# Patient Record
Sex: FEMALE | Race: WHITE | Employment: UNEMPLOYED | URBAN - METROPOLITAN AREA
[De-identification: names, ages, dates, MRNs, and addresses within clinical notes are randomized per-mention and may not be internally consistent; named-entity substitution may affect disease eponyms.]

---

## 2017-04-14 ENCOUNTER — TELEPHONE (OUTPATIENT)
Dept: OPTOMETRY | Facility: CLINIC | Age: 37
End: 2017-04-14

## 2017-04-14 NOTE — TELEPHONE ENCOUNTER
[facility] Call Center    Phone Message    Name of Caller: Samra    Phone Number: Cell number on file:    Telephone Information:   Mobile [phone]       Best time to return call: any    May a detailed message be left on voicemail: yes    Relation to patient: Self    Reason for Call: Other: Patient is calling requesting to speak with the clinic about her contacts. Patient is scheduled for 05/26 but patient is worried she will run out of contacts for one of her eyes and would like to speak with Dr. Orozco staff. Please advise.     Action Taken: Message routed to:  Adult Clinics: Eye p [id number]

## 2017-05-26 ENCOUNTER — OFFICE VISIT (OUTPATIENT)
Dept: OPTOMETRY | Facility: CLINIC | Age: 37
End: 2017-05-26
Payer: COMMERCIAL

## 2017-05-26 DIAGNOSIS — H52.13 MYOPIA, BILATERAL: Primary | ICD-10-CM

## 2017-05-26 PROCEDURE — 92014 COMPRE OPH EXAM EST PT 1/>: CPT | Performed by: OPTOMETRIST

## 2017-05-26 PROCEDURE — 92015 DETERMINE REFRACTIVE STATE: CPT | Performed by: OPTOMETRIST

## 2017-05-26 ASSESSMENT — REFRACTION_WEARINGRX
OD_SPHERE: -6.25
OS_SPHERE: -6.00
SPECS_TYPE: AUTO/SVL
SPECS_TYPE: EXAM
OD_AXIS: 100
OD_CYLINDER: SPHERE
OS_SPHERE: -6.75
OD_SPHERE: -5.50
OD_CYLINDER: +0.50
OS_CYLINDER: SPHERE

## 2017-05-26 ASSESSMENT — REFRACTION_CURRENTRX
OD_BRAND: ACUVUE OASYS
OD_DIAMETER: 14.0
OS_BRAND: ACUVUE OASYS
OD_BASECURVE: 8.4
OS_BASECURVE: 8.4
OS_DIAMETER: 14.0
OS_SPHERE: -6.00
OD_SPHERE: -5.75

## 2017-05-26 ASSESSMENT — VISUAL ACUITY
CORRECTION_TYPE: CONTACTS
OS_CC: 20/20
OD_SC: 20/20
OS_SC: 20/20
OD_CC: 20/20
METHOD: SNELLEN - LINEAR
OD_CC+: -1

## 2017-05-26 ASSESSMENT — TONOMETRY
OS_IOP_MMHG: 9
IOP_METHOD: TONOPEN
OD_IOP_MMHG: 11

## 2017-05-26 ASSESSMENT — CONF VISUAL FIELD
OS_NORMAL: 1
METHOD: COUNTING FINGERS
OD_NORMAL: 1

## 2017-05-26 ASSESSMENT — REFRACTION_MANIFEST
OD_SPHERE: -6.00
OS_SPHERE: -6.75

## 2017-05-26 ASSESSMENT — EXTERNAL EXAM - RIGHT EYE: OD_EXAM: NORMAL

## 2017-05-26 ASSESSMENT — SLIT LAMP EXAM - LIDS
COMMENTS: NORMAL
COMMENTS: NORMAL

## 2017-05-26 ASSESSMENT — CUP TO DISC RATIO
OD_RATIO: 0.2
OS_RATIO: 0.2

## 2017-05-26 ASSESSMENT — EXTERNAL EXAM - LEFT EYE: OS_EXAM: NORMAL

## 2017-05-26 NOTE — PROGRESS NOTES
Chief Complaint   Patient presents with     COMPREHENSIVE EYE EXAM     more contacts/fit fee ok if needed        Previous contact lens wearer? Yes: oasys  Comfort of contact lenses :fine  Satisfied with current lenses: Yes,but power changing        Last Eye Exam: 1-6-2016  Dilated Previously: Yes    What are you currently using to see?  glasses and contacts    Distance Vision Acuity: Noticed gradual change in both eyes    Near Vision Acuity: Satisfied with vision while reading  with glasses or contacts    Eye Comfort: good  Do you use eye drops? : No  Occupation or Hobbies: stay at home      Maribeth Aguirre Optometric Assistant, A.B.O.C.     Medical, surgical and family histories reviewed and updated 5/26/2017.       OBJECTIVE: See Ophthalmology exam    ASSESSMENT:    ICD-10-CM    1. Myopia, bilateral H52.13 EYE EXAM (SIMPLE-NONBILLABLE)     REFRACTION      PLAN:     Patient Instructions   Eyeglass prescription given.    Contact lens prescription given.  There is no change in the prescription.    Return in 1 year for a complete eye exam or sooner if needed.    Kelvin Bal, OD

## 2017-05-26 NOTE — PATIENT INSTRUCTIONS
Eyeglass prescription given.    Contact lens prescription given.  There is no change in the prescription.    Return in 1 year for a complete eye exam or sooner if needed.    Kelvin Bal OD    You can order your contact lenses online at [email or web address].  Click on services at the top of the page, then eye care and you will see the link to order contact lenses.  You can also order contact lenses at [phone].  Be sure to let them know which office you would like to  the lenses, [facility] or [place].    The affects of the dilating drops last for 4- 6 hours.  You will be more sensitive to light and vision will be blurry up close.  Mydriatic sunglasses were given if needed.      Optometry Providers       Clinic Locations                                 Telephone Number   Dr. Rae Bal   [facility] and [place]  [phone]     [place] Optical Hours:                [place] [facility] Hours:       [place] [facility] Hours:  [address] [facility] [address]   [address] [facility]     [address] [place] [address]   [place] [address]    [place] [address]  Phone: [phone]                    Phone [phone]                      Phone: [phone]                          Monday 8:00-7:00                          Monday 8:00-7:00                          Monday 8:00-7:00              Tuesday 8:00-6:00                          Tuesday 8:00-7:00                          Tuesday 8:00-7:00              Wednesday 8:00-6:00                  Wednesday 8:00-7:00                   Wednesday 8:00-7:00      Thursday 8:00-6:00                        Thursday 8:00-7:00                         Thursday 8:00-7:00            Friday 8:00-5:00                              Friday 8:00-5:00                              Friday 8:00-5:00    Please log on to [email or web address] to order your contact lenses.  The link is found on the  Eye Care and Vision Services page.  As always, Thank you for trusting us with your health care needs!

## 2017-05-26 NOTE — MR AVS SNAPSHOT
After Visit Summary   5/26/2017    Samra Virgen    MRN: [id number]           Patient Information     Date Of Birth          1980        Visit Information        Provider Department      5/26/2017 9:00 AM Kelvin Bal, SHANTEL [facility]        Today's Diagnoses     Myopia, bilateral    -  1      Care Instructions    Eyeglass prescription given.    Contact lens prescription given.  There is no change in the prescription.    Return in 1 year for a complete eye exam or sooner if needed.    Kelvin Bal, SHANTEL    You can order your contact lenses online at [email or web address].  Click on services at the top of the page, then eye care and you will see the link to order contact lenses.  You can also order contact lenses at [phone].  Be sure to let them know which office you would like to  the lenses, [place] or [facility].    The affects of the dilating drops last for 4- 6 hours.  You will be more sensitive to light and vision will be blurry up close.  Mydriatic sunglasses were given if needed.      Optometry Providers       Clinic Locations                                 Telephone Number   Dr. Rea Bal   [facility]  [phone]     [place] Optical Hours:                [place] [facility] Hours:       [place] [facility] Hours:  [address]   [address]      [address] [place] [address]   [place] [address]    [place] [address]  Phone: [phone]                    Phone [phone]                      Phone: [phone]                          Monday 8:00-7:00                          Monday 8:00-7:00                          Monday 8:00-7:00              Tuesday 8:00-6:00                          Tuesday 8:00-7:00                          Tuesday 8:00-7:00              Wednesday 8:00-6:00                  Wednesday 8:00-7:00                    Wednesday 8:00-7:00      Thursday 8:00-6:00                        Thursday 8:00-7:00                         Thursday 8:00-7:00            Friday 8:00-5:00                              Friday 8:00-5:00                              Friday 8:00-5:00    Please log on to [email or web address] to order your contact lenses.  The link is found on the Eye Care and Vision Services page.  As always, Thank you for trusting us with your health care needs!              Follow-ups after your visit        Follow-up notes from your care team     Return in about 1 year (around 5/26/2018) for Annual Visit.      Who to contact     If you have questions or need follow up information about today's clinic visit or your schedule please contact [facility] directly at [phone].  Normal or non-critical lab and imaging results will be communicated to you by [facility], letter or phone within 4 business days after the clinic has received the results. If you do not hear from us within 7 days, please contact the clinic through MyChart or phone. If you have a critical or abnormal lab result, we will notify you by phone as soon as possible.  Submit refill requests through Rocket Internet or call your pharmacy and they will forward the refill request to us. Please allow 3 business days for your refill to be completed.          Additional Information About Your Visit        Rocket Internet Information     Rocket Internet is an electronic gateway that provides easy, online access to your medical records. With Rocket Internet, you can request a clinic appointment, read your test results, renew a prescription or communicate with your care team.     To sign up for Rocket Internet visit the website at [email or web address]   You will be asked to enter the access code listed below, as well as some personal information. Please follow the directions to create your username and password.     Your access code is: [email or web address]  Expires: 8/24/2017  9:41 AM     Your  access code will  in 90 days. If you need help or a new code, please contact your [facility] or call [phone] for assistance.        Care EveryWhere ID     This is your Care EveryWhere ID. This could be used by other organizations to access your [place] medical records  [id number]         Blood Pressure from Last 3 Encounters:   No data found for BP    Weight from Last 3 Encounters:   No data found for Wt              We Performed the Following     EYE EXAM (SIMPLE-NONBILLABLE)     REFRACTION        Primary Care Provider    None Specified       No primary provider on file.        Thank you!     Thank you for choosing [facility]  for your care. Our goal is always to provide you with excellent care. Hearing back from our patients is one way we can continue to improve our services. Please take a few minutes to complete the written survey that you may receive in the mail after your visit with us. Thank you!             Your Updated Medication List - Protect others around you: Learn how to safely use, store and throw away your medicines at www.disposemymeds.org.          This list is accurate as of: 17  9:41 AM.  Always use your most recent med list.                   Brand Name Dispense Instructions for use    MIRENA (52 MG) 20 MCG/24HR IUD   Generic drug:  levonorgestrel      1 each by Intrauterine route once

## 2018-05-30 ENCOUNTER — OFFICE VISIT (OUTPATIENT)
Dept: OPTOMETRY | Facility: CLINIC | Age: 38
End: 2018-05-30
Payer: COMMERCIAL

## 2018-05-30 DIAGNOSIS — H52.223 REGULAR ASTIGMATISM OF BOTH EYES: ICD-10-CM

## 2018-05-30 DIAGNOSIS — H52.13 MYOPIA OF BOTH EYES: Primary | ICD-10-CM

## 2018-05-30 PROCEDURE — 92014 COMPRE OPH EXAM EST PT 1/>: CPT | Performed by: OPTOMETRIST

## 2018-05-30 PROCEDURE — 92310 CONTACT LENS FITTING OU: CPT | Mod: GA | Performed by: OPTOMETRIST

## 2018-05-30 PROCEDURE — 92015 DETERMINE REFRACTIVE STATE: CPT | Performed by: OPTOMETRIST

## 2018-05-30 ASSESSMENT — TONOMETRY
OS_IOP_MMHG: 10
IOP_METHOD: TONOPEN
OD_IOP_MMHG: 15

## 2018-05-30 ASSESSMENT — CUP TO DISC RATIO
OD_RATIO: 0.2
OS_RATIO: 0.2

## 2018-05-30 ASSESSMENT — REFRACTION_WEARINGRX
OD_CYLINDER: SPHERE
OD_SPHERE: -6.00
OS_SPHERE: -6.75
OD_SPHERE: -5.50
OS_CYLINDER: SPHERE
SPECS_TYPE: AUTO/SVL
OS_SPHERE: -6.00

## 2018-05-30 ASSESSMENT — EXTERNAL EXAM - LEFT EYE: OS_EXAM: NORMAL

## 2018-05-30 ASSESSMENT — VISUAL ACUITY
CORRECTION_TYPE: CONTACTS
OD_CC: 20/20
METHOD: SNELLEN - LINEAR
OD_CC: 20/20
OS_CC: 20/25
OS_CC: 20/20

## 2018-05-30 ASSESSMENT — CONF VISUAL FIELD
OS_NORMAL: 1
OD_NORMAL: 1

## 2018-05-30 ASSESSMENT — REFRACTION_MANIFEST
OD_CYLINDER: +0.50
OD_AXIS: 095
OD_SPHERE: -7.25
OS_AXIS: 090
OS_CYLINDER: +0.25
OS_SPHERE: -7.25

## 2018-05-30 ASSESSMENT — SLIT LAMP EXAM - LIDS
COMMENTS: COLLARETTES, MEIBOMIAN GLAND DYSFUNCTION
COMMENTS: COLLARETTES, MEIBOMIAN GLAND DYSFUNCTION

## 2018-05-30 ASSESSMENT — REFRACTION_CURRENTRX
OD_SPHERE: -5.75
OD_SPHERE: -6.50
OS_SPHERE: -6.50
OS_SPHERE: -6.00

## 2018-05-30 ASSESSMENT — EXTERNAL EXAM - RIGHT EYE: OD_EXAM: NORMAL

## 2018-05-30 NOTE — PATIENT INSTRUCTIONS
Eyeglass prescription given.    Dispensed trial contacts.  Return in 1 week for a contact lens check.  Be sure to wear contact lenses in to that appointment.    Recommend returning for dilated fundus exam. It is a more thorough exam of the retina.    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 Cliradex eyelid scrubs 2 x day for 8 weeks.  These can be purchased online Soundvamp or at [employer] or [employer] Oust foaming eyelid cleanser.      Recommend annual eye exams.    Kelvin Bal OD        Optometry Providers       Clinic Locations                                 Telephone Number   Dr. Rae Teresa [place]   [facility] [place]   [place] [phone]     [place] [facility] Hours:                [place] [facility] Hours:       [place] [facility] Hours:   [address]   [address] [facility]     [address] [place] [address]   [place] [address]    [place] [address]  Phone: [phone]                    Phone: [phone]     Phone: [phone]                      Monday 8:00-7:00                          Monday 8:00-7:00                          Monday 8:00-7:00              Tuesday 8:00-6:00                          Tuesday 8:00-7:00                          Tuesday 8:00-7:00              Wednesday 8:00-6:00                  Wednesday 8:00-7:00                   Wednesday 8:00-7:00      Thursday 8:00-6:00                        Thursday 8:00-7:00                         Thursday 8:00-7:00            Friday 8:00-5:00                              Friday 8:00-5:00                              Friday 8:00-5:00    [place] Optical Hours:   [address] [facility] [place] [id number] [phone]    Monday 8:00-7:00  Tuesday 8:00-7:00  Wednesday 8:00-7:00  Thursday 8:00-7:00  Friday 8:00-5:00  Please log on to [email or web address] to order your contact lenses.  The link is found on the Eye Care and Vision Services  page.  As always, Thank you for trusting us with your health care needs!

## 2018-05-30 NOTE — MR AVS SNAPSHOT
After Visit Summary   5/30/2018    Samra Virgen    MRN: [id number]           Patient Information     Date Of Birth          1980        Visit Information        Provider Department      5/30/2018 10:00 AM Kelvin Bal, SHANTEL [facility]        Today's Diagnoses     Myopia of both eyes    -  1    Regular astigmatism of both eyes          Care Instructions    Eyeglass prescription given.    Dispensed trial contacts.  Return in 1 week for a contact lens check.  Be sure to wear contact lenses in to that appointment.    Recommend returning for dilated fundus exam. It is a more thorough exam of the retina.    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 Cliradex eyelid scrubs 2 x day for 8 weeks.  These can be purchased online Pumodo or at [employer] or OcusofQuinju.comst foaming eyelid cleanser.      Recommend annual eye exams.    Kelvin Bal OD        Optometry Providers       Clinic Locations                                 Telephone Number   Dr. Rae Teresa [place]   [facility] [phone]     [place] [facility] Hours:                [place] [facility] Hours:       [place] [facility] Hours:   [address]   [address] [facility]     [address] [place] [address]   [place] [address]    [place] [address]  Phone: [phone]                    Phone: [phone]     Phone: [phone]                      Monday 8:00-7:00                          Monday 8:00-7:00                          Monday 8:00-7:00              Tuesday 8:00-6:00                          Tuesday 8:00-7:00                          Tuesday 8:00-7:00              Wednesday 8:00-6:00                  Wednesday 8:00-7:00                   Wednesday 8:00-7:00      Thursday 8:00-6:00                        Thursday 8:00-7:00                         Thursday 8:00-7:00            Friday  8:00-5:00                              Friday 8:00-5:00                              Friday 8:00-5:00    [place] [facility] Hours:   [address] [place] [address]  [phone]    Monday 8:00-7:00  Tuesday 8:00-7:00  Wednesday 8:00-7:00  Thursday 8:00-7:00  Friday 8:00-5:00  Please log on to [email or web address] to order your contact lenses.  The link is found on the Eye Care and Vision Services page.  As always, Thank you for trusting us with your health care needs!            Follow-ups after your visit        Who to contact     If you have questions or need follow up information about today's clinic visit or your schedule please contact [facility] directly at [phone].  Normal or non-critical lab and imaging results will be communicated to you by MyChart, letter or phone within 4 business days after the clinic has received the results. If you do not hear from us within 7 days, please contact the clinic through ShopSavvyhart or phone. If you have a critical or abnormal lab result, we will notify you by phone as soon as possible.  Submit refill requests through Disruptive By Design or call your pharmacy and they will forward the refill request to us. Please allow 3 business days for your refill to be completed.          Additional Information About Your Visit        MyChart Information     Disruptive By Design is an electronic gateway that provides easy, online access to your medical records. With Disruptive By Design, you can request a clinic appointment, read your test results, renew a prescription or communicate with your care team.     To sign up for Disruptive By Design visit the website at [email or web address]   You will be asked to enter the access code listed below, as well as some personal information. Please follow the directions to create your username and password.     Your access code is: [email or web address]  Expires: 2018 11:02 AM     Your access code will  in 90 days. If you need help or a new code, please contact your  [facility] or call [phone] for assistance.        Care EveryWhere ID     This is your Care EveryWhere ID. This could be used by other organizations to access your [place] medical records  [id number]         Blood Pressure from Last 3 Encounters:   No data found for BP    Weight from Last 3 Encounters:   No data found for Wt              We Performed the Following     CONTACT LENS FITTING,BILAT     EYE EXAM (SIMPLE-NONBILLABLE)     REFRACTION        Primary Care Provider Office Phone # Fax #    Khloe Coto [phone]       [facility] [address]  [place] [address]        Equal Access to Services     [facility]: Hadii aad ku hadasho Soomaali, waaxda luqadaha, qaybta kaalmada adeegyada, waxay idiin hayaan adeeg kharasherry lamirtha . So [facility] [phone].    ATENCIÓN: Si habla español, tiene a bower disposición servicios gratuitos de asistencia lingüística. Llame al [phone].    We comply with applicable federal civil rights laws and [place] laws. We do not discriminate on the basis of race, color, national origin, age, disability, sex, sexual orientation, or gender identity.            Thank you!     Thank you for choosing [facility]  for your care. Our goal is always to provide you with excellent care. Hearing back from our patients is one way we can continue to improve our services. Please take a few minutes to complete the written survey that you may receive in the mail after your visit with us. Thank you!             Your Updated Medication List - Protect others around you: Learn how to safely use, store and throw away your medicines at www.disposemymeds.org.          This list is accurate as of 5/30/18 11:02 AM.  Always use your most recent med list.                   Brand Name Dispense Instructions for use Diagnosis    MELATONIN PO           MIRENA (52 MG) 20 MCG/24HR IUD   Generic drug:  levonorgestrel      1 each by  Intrauterine route once        XYZAL PO

## 2018-05-30 NOTE — LETTER
5/30/2018         RE: Samra Virgen  [address]  [place] [address]        Dear Colleague,    Thank you for referring your patient, Samra Virgen, to the [facility]. Please see a copy of my visit note below.    Chief Complaint   Patient presents with     COMPREHENSIVE EYE EXAM     more contacts fit fee ok        Previous contact lens wearer? Yes: oasys  Comfort of contact lenses :fine  Satisfied with current lenses: Yes,a little dry        Last Eye Exam: 5-  Dilated Previously: Yes    What are you currently using to see?  glasses and contacts    Distance Vision Acuity: Noticed gradual change in both eyes    Near Vision Acuity: Satisfied with vision while reading  with glasses and contacts  Eye Comfort: good  Do you use eye drops? : Yes: rewetting drops,opti free premoist drops,uses Clear Care  Occupation or Hobbies: stay at home      Maribeth Aguirre Optometric Assistant, A.B.O.C.     Medical, surgical and family histories reviewed and updated 5/30/2018.       OBJECTIVE: See Ophthalmology exam    ASSESSMENT:    ICD-10-CM    1. Myopia of both eyes H52.13 CONTACT LENS FITTING,BILAT     EYE EXAM (SIMPLE-NONBILLABLE)     REFRACTION   2. Regular astigmatism of both eyes H52.223 CONTACT LENS FITTING,BILAT     EYE EXAM (SIMPLE-NONBILLABLE)     REFRACTION      PLAN:     Patient Instructions   Eyeglass prescription given.    Dispensed trial contacts.  Return in 1 week for a contact lens check.  Be sure to wear contact lenses in to that appointment.    Recommend returning for dilated fundus exam. It is a more thorough exam of the retina.    Recommend annual eye exams.    Kelvin Bal, SHANTEL                         Again, thank you for allowing me to participate in the care of your patient.        Sincerely,        Kelvin Bal, OD

## 2018-05-30 NOTE — PROGRESS NOTES
Chief Complaint   Patient presents with     COMPREHENSIVE EYE EXAM     more contacts fit fee ok        Previous contact lens wearer? Yes: oasys  Comfort of contact lenses :fine  Satisfied with current lenses: Yes,a little dry        Last Eye Exam: 5-  Dilated Previously: Yes    What are you currently using to see?  glasses and contacts    Distance Vision Acuity: Noticed gradual change in both eyes    Near Vision Acuity: Satisfied with vision while reading  with glasses and contacts  Eye Comfort: good  Do you use eye drops? : Yes: rewetting drops,opti free premoist drops,uses Clear Care  Occupation or Hobbies: stay at home      Maribeth Aguirre Optometric Assistant, A.B.O.C.     Medical, surgical and family histories reviewed and updated 5/30/2018.       OBJECTIVE: See Ophthalmology exam    ASSESSMENT:    ICD-10-CM    1. Myopia of both eyes H52.13 CONTACT LENS FITTING,BILAT     EYE EXAM (SIMPLE-NONBILLABLE)     REFRACTION   2. Regular astigmatism of both eyes H52.223 CONTACT LENS FITTING,BILAT     EYE EXAM (SIMPLE-NONBILLABLE)     REFRACTION      PLAN:     Patient Instructions   Eyeglass prescription given.    Dispensed trial contacts.  Return in 1 week for a contact lens check.  Be sure to wear contact lenses in to that appointment.    Recommend returning for dilated fundus exam. It is a more thorough exam of the retina.    Recommend annual eye exams.    Kelvin Bal, OD

## 2018-06-20 ENCOUNTER — OFFICE VISIT (OUTPATIENT)
Dept: OPTOMETRY | Facility: CLINIC | Age: 38
End: 2018-06-20
Payer: COMMERCIAL

## 2018-06-20 DIAGNOSIS — H52.13 MYOPIA OF BOTH EYES: Primary | ICD-10-CM

## 2018-06-20 PROCEDURE — 99207 ZZC NO BILLABLE SERVICE THIS VISIT: CPT | Performed by: OPTOMETRIST

## 2018-06-20 ASSESSMENT — REFRACTION_CURRENTRX
OD_SPHERE: -6.50
OS_SPHERE: -6.50

## 2018-06-20 NOTE — PATIENT INSTRUCTIONS
The prescription needs to be adjusted.    Dispensed trial contacts.  Ok to order if satisfied.  Call if not and interested in trying a different brand of daily lenses.    Continue Claridex- 2 x day for 6 weeks.    Return in 1 year for a complete eye exam or sooner if needed.    Kelvin Bal, OD

## 2018-06-20 NOTE — PROGRESS NOTES
Chief Complaint   Patient presents with     Contact Lens Check     Satisfied with contacts:  maybe    Good comfort:  Yes  Clear vision:     Goes in and out of focus sometimes just started 1st few days ok    Maribeth Aguirre Optometric Assistant, A.B.O.C.          Medical, surgical and family histories reviewed and updated 6/20/2018.       OBJECTIVE: See Ophthalmology exam    ASSESSMENT:    ICD-10-CM    1. Myopia of both eyes H52.13 CONTACT LENS CHECK      PLAN:    Patient Instructions   The prescription needs to be adjusted.    Dispensed trial contacts.  Ok to order if satisfied.  Call if not and interested in trying a different brand of daily lenses.    Continue Claridex- 2 x day for 6 weeks.    Return in 1 year for a complete eye exam or sooner if needed.    Kelvin Bal, OD

## 2018-06-20 NOTE — MR AVS SNAPSHOT
After Visit Summary   6/20/2018    Samra Virgen    MRN: [id number]           Patient Information     Date Of Birth          1980        Visit Information        Provider Department      6/20/2018 10:20 AM Kelvin Bal, SHANTEL [facility]        Today's Diagnoses     Myopia of both eyes    -  1      Care Instructions    The prescription needs to be adjusted.    Dispensed trial contacts.  Ok to order if satisfied.  Call if not and interested in trying a different brand of daily lenses.    Continue Claridex- 2 x day for 6 weeks.    Return in 1 year for a complete eye exam or sooner if needed.    Kelvin Bal OD            Follow-ups after your visit        Follow-up notes from your care team     Return in about 1 year (around 6/20/2019), or if symptoms worsen or fail to improve, for Annual Visit.      Who to contact     If you have questions or need follow up information about today's clinic visit or your schedule please contact [facility] directly at [phone].  Normal or non-critical lab and imaging results will be communicated to you by Trillianthart, letter or phone within 4 business days after the clinic has received the results. If you do not hear from us within 7 days, please contact the clinic through Baboot or phone. If you have a critical or abnormal lab result, we will notify you by phone as soon as possible.  Submit refill requests through WiChorus or call your pharmacy and they will forward the refill request to us. Please allow 3 business days for your refill to be completed.          Additional Information About Your Visit        MyChart Information     WiChorus is an electronic gateway that provides easy, online access to your medical records. With WiChorus, you can request a clinic appointment, read your test results, renew a prescription or communicate with your care team.     To sign up for WiChorus visit the website at [email or web address]    You will be asked to enter the access code listed below, as well as some personal information. Please follow the directions to create your username and password.     Your access code is: [email or web address]  Expires: 2018 11:02 AM     Your access code will  in 90 days. If you need help or a new code, please contact your [facility] or call [phone] for assistance.        Care EveryWhere ID     This is your Care EveryWhere ID. This could be used by other organizations to access your [facility] medical records  [id number]         Blood Pressure from Last 3 Encounters:   No data found for BP    Weight from Last 3 Encounters:   No data found for Wt              We Performed the Following     CONTACT LENS CHECK        Primary Care Provider Office Phone # Fax #    Khloe URIBE Nnamdi [phone]       [facility] [address]  [place] [address]        Equal Access to Services     [facility] : Hadii aad ku hadasho Soomaali, waaxda luqadaha, qaybta kaalmada adeegyada, waxay idiin hayaan adeeg kharash la'aan . So [facility] [phone].    ATENCIÓN: Si habla español, tiene a bower disposición servicios gratuitos de asistencia lingüística. Llame al [phone].    We comply with applicable federal civil rights laws and [place] laws. We do not discriminate on the basis of race, color, national origin, age, disability, sex, sexual orientation, or gender identity.            Thank you!     Thank you for choosing [facility]  for your care. Our goal is always to provide you with excellent care. Hearing back from our patients is one way we can continue to improve our services. Please take a few minutes to complete the written survey that you may receive in the mail after your visit with us. Thank you!             Your Updated Medication List - Protect others around you: Learn how to safely use, store and throw away your medicines at  www.disposemymeds.org.          This list is accurate as of 6/20/18 11:03 AM.  Always use your most recent med list.                   Brand Name Dispense Instructions for use Diagnosis    MELATONIN PO           MIRENA (52 MG) 20 MCG/24HR IUD   Generic drug:  levonorgestrel      1 each by Intrauterine route once        XYZAL PO

## 2018-07-02 ASSESSMENT — REFRACTION_CURRENTRX
OS_BRAND: J&J 1-DAY ACUVUE MOIST BC 8.5, D 14.2
OS_BRAND: J&J 1-DAY ACUVUE MOIST BC 8.5, D 14.2
OD_BRAND: J&J 1-DAY ACUVUE MOIST BC 8.5, D 14.2
OD_BRAND: ACUVUE OASYS
OD_DIAMETER: 14.0
OD_BRAND: J&J 1-DAY ACUVUE MOIST BC 8.5, D 14.2
OD_BASECURVE: 8.4
OS_BRAND: ACUVUE OASYS
OS_BASECURVE: 8.4
OS_DIAMETER: 14.0

## 2019-06-26 ENCOUNTER — OFFICE VISIT (OUTPATIENT)
Dept: OPTOMETRY | Facility: CLINIC | Age: 39
End: 2019-06-26
Payer: COMMERCIAL

## 2019-06-26 DIAGNOSIS — H52.223 REGULAR ASTIGMATISM OF BOTH EYES: ICD-10-CM

## 2019-06-26 DIAGNOSIS — Z01.00 EXAMINATION OF EYES AND VISION: Primary | ICD-10-CM

## 2019-06-26 DIAGNOSIS — H52.13 MYOPIA OF BOTH EYES: ICD-10-CM

## 2019-06-26 PROCEDURE — 92014 COMPRE OPH EXAM EST PT 1/>: CPT | Performed by: OPTOMETRIST

## 2019-06-26 PROCEDURE — 92015 DETERMINE REFRACTIVE STATE: CPT | Performed by: OPTOMETRIST

## 2019-06-26 ASSESSMENT — TONOMETRY
IOP_METHOD: TONOPEN
OS_IOP_MMHG: 17
OD_IOP_MMHG: 15

## 2019-06-26 ASSESSMENT — EXTERNAL EXAM - RIGHT EYE: OD_EXAM: NORMAL

## 2019-06-26 ASSESSMENT — VISUAL ACUITY
OD_CC: 20/20
CORRECTION_TYPE: GLASSES
OS_SC: CF @ 3'
OS_CC: 20/20
OS_CC+: +2
METHOD: SNELLEN - LINEAR
OS_CC: 20/40
OS_CC: 20/20
OD_CC: 20/30
OD_SC: CF @ 3'
OD_CC: 20/20
CORRECTION_TYPE: CONTACTS
METHOD: SNELLEN - LINEAR

## 2019-06-26 ASSESSMENT — REFRACTION_MANIFEST
OD_CYLINDER: +0.25
OD_AXIS: 095
OD_SPHERE: -7.00
OS_SPHERE: -7.25
OS_CYLINDER: +0.25
OS_AXIS: 090

## 2019-06-26 ASSESSMENT — EXTERNAL EXAM - LEFT EYE: OS_EXAM: NORMAL

## 2019-06-26 ASSESSMENT — REFRACTION_WEARINGRX
OS_AXIS: 090
OD_CYLINDER: SPHERE
OS_CYLINDER: +0.25
SPECS_TYPE: AUTO/SVL
OS_SPHERE: -7.25
OS_CYLINDER: SPHERE
OD_AXIS: 095
SPECS_TYPE: LEE 18
OD_SPHERE: -7.25
OD_SPHERE: -5.50
OS_SPHERE: -6.00
OD_CYLINDER: +0.50

## 2019-06-26 ASSESSMENT — CONF VISUAL FIELD
METHOD: COUNTING FINGERS
OS_NORMAL: 1
OD_NORMAL: 1

## 2019-06-26 ASSESSMENT — SLIT LAMP EXAM - LIDS
COMMENTS: MEIBOMIAN GLAND DYSFUNCTION
COMMENTS: MEIBOMIAN GLAND DYSFUNCTION

## 2019-06-26 ASSESSMENT — CUP TO DISC RATIO
OS_RATIO: 0.2
OD_RATIO: 0.2

## 2019-06-26 NOTE — PATIENT INSTRUCTIONS
Eyeglass prescription given.    Dispensed trial contacts of Daijeanette Total One-  Return in 1 week for a contact lens check if you prefer that lens. Be sure to wear contact lenses in to that appointment.    Blink tears- 1 drop both eyes 2-4 x daily.    Return in 1 year for a complete eye exam or sooner if needed.    Kelvin Bal, OD

## 2019-06-27 ENCOUNTER — TELEPHONE (OUTPATIENT)
Dept: OPTOMETRY | Facility: CLINIC | Age: 39
End: 2019-06-27

## 2019-06-27 ASSESSMENT — REFRACTION_CURRENTRX
OD_SPHERE: -6.00
OD_BRAND: ALCON DAILIES TOTAL 1 BC 8.5, D 14.1
OS_SPHERE: -6.50
OD_SPHERE: -6.50
OS_SPHERE: -6.00
OD_ADDL_SPECS: WEARING
OS_SPHERE: -6.00
OS_BRAND: ALCON DAILIES TOTAL 1 BC 8.5, D 14.1
OD_SPHERE: -6.00
OD_BRAND: J&J 1-DAY ACUVUE MOIST BC 8.5, D 14.2
OD_BRAND: ALCON DAILIES TOTAL 1 BC 8.5, D 14.1
OS_BRAND: J&J 1-DAY ACUVUE MOIST BC 8.5, D 14.2
OS_BRAND: ALCON DAILIES TOTAL 1 BC 8.5, D 14.1

## 2019-06-27 NOTE — TELEPHONE ENCOUNTER
[facility] Call Center    Phone Message    May a detailed message be left on voicemail: yes    Reason for Call: Patient called and stated she was advised in clinic visit that her perscription for contact lenses had not change. Patient states that the contact sample she was given in clinic is different. Patient request a call back to discuss, please advise.      Action Taken: Message routed to:  Adult Clinics: Eye p [phone]

## 2019-06-27 NOTE — TELEPHONE ENCOUNTER
Talked with patient 6/27/2019- she has been wearing -6.00 both eyes- not -6.50 as in her records.  Will try Dailies Total 1 in -6.50 and will see if she likes that power better.  Will need to return for cl check if she would like prescription for Dailies Total 1.       Ok to dispense -6.00 in Dailies Total 1 if preferred.       Kelvin Bal, SHANTEL      A prescription was sent in the mail with both options for Acuvue 1 Day Moist which she has worn before.    Kelvin Bal, OD

## 2019-06-27 NOTE — TELEPHONE ENCOUNTER
Patient was using -6.0 prescription for her contact lenses. The new trial lens and prescription she was given at her visit yesterday were for -6.5.  She understood at the visit that there were going to be no changes to her actual prescription. Please advise on next steps and if she should be on the -6.0 prescription for trial and permanent contacts.  Adriana Lucas RN

## 2020-07-06 ENCOUNTER — OFFICE VISIT (OUTPATIENT)
Dept: OPTOMETRY | Facility: CLINIC | Age: 40
End: 2020-07-06
Payer: COMMERCIAL

## 2020-07-06 DIAGNOSIS — Z01.00 ENCOUNTER FOR EXAMINATION OF EYES AND VISION WITHOUT ABNORMAL FINDINGS: Primary | ICD-10-CM

## 2020-07-06 DIAGNOSIS — H52.221 REGULAR ASTIGMATISM OF RIGHT EYE: ICD-10-CM

## 2020-07-06 DIAGNOSIS — H52.13 MYOPIA OF BOTH EYES: ICD-10-CM

## 2020-07-06 DIAGNOSIS — Z46.0 CONTACT LENS/GLASSES FITTING: ICD-10-CM

## 2020-07-06 PROCEDURE — 92014 COMPRE OPH EXAM EST PT 1/>: CPT | Performed by: OPTOMETRIST

## 2020-07-06 PROCEDURE — 92310 CONTACT LENS FITTING OU: CPT | Performed by: OPTOMETRIST

## 2020-07-06 PROCEDURE — 92015 DETERMINE REFRACTIVE STATE: CPT | Performed by: OPTOMETRIST

## 2020-07-06 ASSESSMENT — REFRACTION_MANIFEST
OD_AXIS: 107
OD_SPHERE: -7.75
OD_CYLINDER: +0.50
OS_CYLINDER: SPHERE
OS_SPHERE: -8.00

## 2020-07-06 ASSESSMENT — REFRACTION_WEARINGRX
OS_CYLINDER: +0.25
OS_SPHERE: -6.00
OD_SPHERE: -5.50
SPECS_TYPE: LEE 18
OD_CYLINDER: +0.50
SPECS_TYPE: AUTO/SVL
OS_CYLINDER: SPHERE
OD_AXIS: 095
OS_AXIS: 090
OD_SPHERE: -7.25
OD_CYLINDER: SPHERE
OS_SPHERE: -7.25

## 2020-07-06 ASSESSMENT — VISUAL ACUITY
CORRECTION_TYPE: CONTACTS
METHOD: SNELLEN - LINEAR
OD_CC: 20/20
OD_CC: 20/20
OS_CC: 20/25
OS_CC: 20/20

## 2020-07-06 ASSESSMENT — REFRACTION_CURRENTRX
OD_BRAND: J&J 1-DAY ACUVUE MOIST BC 8.5, D 14.2
OD_SPHERE: -6.00
OS_SPHERE: -6.00
OS_BRAND: J&J 1-DAY ACUVUE MOIST BC 8.5, D 14.2

## 2020-07-06 ASSESSMENT — CONF VISUAL FIELD
OS_NORMAL: 1
OD_NORMAL: 1

## 2020-07-06 ASSESSMENT — CUP TO DISC RATIO
OS_RATIO: 0.2
OD_RATIO: 0.2

## 2020-07-06 ASSESSMENT — SLIT LAMP EXAM - LIDS
COMMENTS: NORMAL
COMMENTS: NORMAL

## 2020-07-06 ASSESSMENT — EXTERNAL EXAM - LEFT EYE: OS_EXAM: NORMAL

## 2020-07-06 ASSESSMENT — TONOMETRY
OD_IOP_MMHG: 14
OS_IOP_MMHG: 14
IOP_METHOD: APPLANATION

## 2020-07-06 ASSESSMENT — EXTERNAL EXAM - RIGHT EYE: OD_EXAM: NORMAL

## 2020-07-06 NOTE — PATIENT INSTRUCTIONS
Updated glasses prescription provided today.     Given trial contact lenses today (-6.50 each eye). Try these lenses for at least a couple days to make sure the updated power will work well for you before ordering.  Contact lens prescription provided today.     Dilation deferred today. Patient educated on the importance of dilation in order to fully assess the internal ocular health. Patient voiced understanding. Return to clinic to complete the dilated portion of the examination, if desired.       Ruperto Goodrich O.D.  [facility]  [address] [place]  [address]

## 2020-07-06 NOTE — LETTER
7/6/2020         RE: Samra Virgen  [address]  [place] [address]        Dear Colleague,    Thank you for referring your patient, Samra Virgen, to the [facility]. Please see a copy of my visit note below.    Chief Complaint   Patient presents with     Annual Eye Exam     Accompanied by self  Previous contact lens wearer? Yes: , not due for a fitting  Comfort of contact lenses :Good  Satisfied with current lenses: Yes        Last Eye Exam: 1 year ago  Dilated Previously: Declined dilation today    What are you currently using to see?  glasses and contacts    Distance Vision Acuity: Noticed gradual change in both eyes    Near Vision Acuity: Satisfied with vision while reading  unaided    Eye Comfort: dry  Do you use eye drops? : Yes: blink tears, using morning and night  Occupation or Hobbies: stay at home      Vanda Gonzales, Optometry Tech     Medical, surgical and family histories reviewed and updated 7/6/2020.       OBJECTIVE: See Ophthalmology exam    ASSESSMENT:    ICD-10-CM    1. Encounter for examination of eyes and vision without abnormal findings  Z01.00    2. Myopia of both eyes  H52.13    3. Regular astigmatism of right eye  H52.221    4. Contact lens/glasses fitting  Z46.0       PLAN:     Patient Instructions   Updated glasses prescription provided today.     Given trial contact lenses today (-6.50 each eye). Try these lenses for at least a couple days to make sure the updated power will work well for you before ordering.  Contact lens prescription provided today.     Dilation deferred today. Patient educated on the importance of dilation in order to fully assess the internal ocular health. Patient voiced understanding. Return to clinic to complete the dilated portion of the examination, if desired.       Ruperto Goodrich O.D.  [facility]  [address] [place] [address]. [place]  [id number]                           Again, thank you for allowing me to  participate in the care of your patient.        Sincerely,        Ruperto Goodrich OD

## 2020-07-06 NOTE — PROGRESS NOTES
Chief Complaint   Patient presents with     Annual Eye Exam     Accompanied by self  Previous contact lens wearer? Yes: , not due for a fitting  Comfort of contact lenses :Good  Satisfied with current lenses: Yes        Last Eye Exam: 1 year ago  Dilated Previously: Declined dilation today    What are you currently using to see?  glasses and contacts    Distance Vision Acuity: Noticed gradual change in both eyes    Near Vision Acuity: Satisfied with vision while reading  unaided    Eye Comfort: dry  Do you use eye drops? : Yes: blink tears, using morning and night  Occupation or Hobbies: stay at home      Vanda Gonzales, Optometry Tech     Medical, surgical and family histories reviewed and updated 7/6/2020.       OBJECTIVE: See Ophthalmology exam    ASSESSMENT:    ICD-10-CM    1. Encounter for examination of eyes and vision without abnormal findings  Z01.00    2. Myopia of both eyes  H52.13    3. Regular astigmatism of right eye  H52.221    4. Contact lens/glasses fitting  Z46.0       PLAN:     Patient Instructions   Updated glasses prescription provided today.     Given trial contact lenses today (-6.50 each eye). Try these lenses for at least a couple days to make sure the updated power will work well for you before ordering.  Contact lens prescription provided today.     Dilation deferred today. Patient educated on the importance of dilation in order to fully assess the internal ocular health. Patient voiced understanding. Return to clinic to complete the dilated portion of the examination, if desired.       Ruperto Goodrich O.D.  [facility]  [address] [place] [address]. [place]  [address]